# Patient Record
Sex: MALE
[De-identification: names, ages, dates, MRNs, and addresses within clinical notes are randomized per-mention and may not be internally consistent; named-entity substitution may affect disease eponyms.]

---
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
## 2018-09-30 ENCOUNTER — HOSPITAL ENCOUNTER (EMERGENCY)
Dept: HOSPITAL 14 - H.ER | Age: 18
Discharge: HOME | End: 2018-09-30
Payer: COMMERCIAL

## 2018-09-30 ENCOUNTER — HOSPITAL ENCOUNTER (EMERGENCY)
Dept: HOSPITAL 14 - H.ER | Age: 18
LOS: 1 days | Discharge: HOME | End: 2018-10-01
Payer: COMMERCIAL

## 2018-09-30 VITALS — TEMPERATURE: 99.5 F

## 2018-09-30 VITALS — RESPIRATION RATE: 18 BRPM

## 2018-09-30 VITALS — OXYGEN SATURATION: 97 %

## 2018-09-30 VITALS — SYSTOLIC BLOOD PRESSURE: 120 MMHG | HEART RATE: 97 BPM | DIASTOLIC BLOOD PRESSURE: 68 MMHG | RESPIRATION RATE: 16 BRPM

## 2018-09-30 DIAGNOSIS — J02.9: Primary | ICD-10-CM

## 2018-09-30 DIAGNOSIS — R50.9: Primary | ICD-10-CM

## 2018-09-30 DIAGNOSIS — J02.9: ICD-10-CM

## 2018-09-30 LAB
ALBUMIN SERPL-MCNC: 4 G/DL (ref 3.5–5)
ALBUMIN/GLOB SERPL: 1.2 {RATIO} (ref 1–2.1)
ALT SERPL-CCNC: 35 U/L (ref 21–72)
ANISOCYTOSIS BLD QL SMEAR: SLIGHT
AST SERPL-CCNC: 25 U/L (ref 17–59)
BASOPHILS # BLD AUTO: 0 K/UL (ref 0–0.2)
BASOPHILS NFR BLD: 0.3 % (ref 0–2)
BUN SERPL-MCNC: 16 MG/DL (ref 9–20)
CALCIUM SERPL-MCNC: 8.5 MG/DL (ref 8.4–10.2)
EOSINOPHIL # BLD AUTO: 0 K/UL (ref 0–0.7)
EOSINOPHIL NFR BLD: 0.1 % (ref 0–4)
ERYTHROCYTE [DISTWIDTH] IN BLOOD BY AUTOMATED COUNT: 14.1 % (ref 11.5–14.5)
GFR NON-AFRICAN AMERICAN: > 60
HGB BLD-MCNC: 13.8 G/DL (ref 12–18)
LYMPHOCYTE: 17 % (ref 20–50)
LYMPHOCYTES # BLD AUTO: 0.9 K/UL (ref 1–4.3)
LYMPHOCYTES NFR BLD AUTO: 8.6 % (ref 20–40)
MCH RBC QN AUTO: 29.4 PG (ref 27–31)
MCHC RBC AUTO-ENTMCNC: 33.3 G/DL (ref 33–37)
MCV RBC AUTO: 88.2 FL (ref 80–94)
MONOCYTE: 3 % (ref 0–10)
MONOCYTES # BLD: 0.9 K/UL (ref 0–0.8)
MONOCYTES NFR BLD: 8.1 % (ref 0–10)
NEUTROPHILS # BLD: 8.7 K/UL (ref 1.8–7)
NEUTROPHILS NFR BLD AUTO: 80 % (ref 42–75)
NEUTROPHILS NFR BLD AUTO: 82.9 % (ref 50–75)
NRBC BLD AUTO-RTO: 0.1 % (ref 0–0)
OVALOCYTES BLD QL SMEAR: SLIGHT
PLATELET # BLD EST: NORMAL 10*3/UL
PLATELET # BLD: 164 K/UL (ref 130–400)
PMV BLD AUTO: 9.9 FL (ref 7.2–11.7)
POIKILOCYTOSIS BLD QL SMEAR: SLIGHT
RBC # BLD AUTO: 4.71 MIL/UL (ref 4.4–5.9)
TEARDROP CELLS: SLIGHT
TOTAL CELLS COUNTED BLD: 100
WBC # BLD AUTO: 10.5 K/UL (ref 4.8–10.8)

## 2018-09-30 PROCEDURE — 85025 COMPLETE CBC W/AUTO DIFF WBC: CPT

## 2018-09-30 PROCEDURE — 80053 COMPREHEN METABOLIC PANEL: CPT

## 2018-09-30 PROCEDURE — 99284 EMERGENCY DEPT VISIT MOD MDM: CPT

## 2018-09-30 NOTE — ED PDOC
HPI: Fever


Time Seen by Provider: 09/30/18 01:41


Fever Onset Was: 09/29/18 (17:00)


Recent Sick Contacts: No


Have you had recent travel within the past 21 days to any of the following 

countries: [place], [place], [place] or [place]?: No


Additional Comments: 18 year old male with no pmhx presents to the ED for 

evaluation of a fever associated with sore throat and nasal congestion onset 

1700. Patient states that he took his friend's amoxicillin and his sx resolved, 

but they returned later in the evening, prompting the ER visit. Denies sick 

contact, recent travel, nausea, and vomiting.





Past Medical History


Reviewed: Historical Data, Nursing Documentation, Vital Signs


Vital Signs: 





                                Last Vital Signs











Temp  103 F H  09/30/18 01:20


 


Pulse  110 H  09/30/18 01:20


 


Resp  19   09/30/18 01:20


 


BP  127/74   09/30/18 01:20


 


Pulse Ox  97   09/30/18 01:20














- Medical History


PMH: No Chronic Diseases





- Surgical History


Surgical History: No Surg Hx





- Family History


Family History: States: Unknown Family Hx





- Social History


Current smoker - smoking cessation education provided: No


Alcohol: None


Drugs: Denies





- Home Medications


Home Medications: 


                                Ambulatory Orders











 Medication  Instructions  Recorded


 


Naproxen [Naprosyn Tab] 1 tab PO Q8 PRN #21 tab 05/15/17


 


Albuterol HFA [Ventolin HFA 90 2 puff IH [id number] #1 puff 10/11/17





mcg/actuation (8 g)]  


 


Cyclobenzaprine [Cyclobenzaprine 10 mg PO BID #15 tab 10/11/17





HCl]  


 


Ibuprofen [Motrin Tab] 600 mg PO Q6 #30 tab 10/11/17


 


Cephalexin [Keflex] 500 mg PO QID #28 capsule 11/22/17


 


Ibuprofen [Motrin] 600 mg PO Q8 PRN #21 tab 11/22/17


 


Sulfamethoxazole/Trimethoprim 2 tab PO BID #28 tab 11/22/17





[Bactrim  mg-160 mg]  


 


Ibuprofen [Motrin Tab] 600 mg PO Q6 #30 tab 09/30/18














- Allergies


Allergies/Adverse Reactions: 


                                    Allergies











Allergy/AdvReac Type Severity Reaction Status Date / Time


 


No Known Allergies Allergy   Verified 11/23/17 14:22














Review of Systems


ROS Statement: Except As Marked, All Systems Reviewed And Found Negative


Constitutional: Positive for: Fever


ENT: Positive for: Nose Congestion, Throat Pain


Gastrointestinal: Negative for: Nausea, Vomiting





Physical Exam





- Reviewed


Nursing Documentation Reviewed: Yes


Vital Signs Reviewed: Yes





- Physical Exam


Appears: Positive for: No Acute Distress


Head Exam: Positive for: ATRAUMATIC, NORMOCEPHALIC


Skin: Positive for: Normal Color, Warm, Dry.  Negative for: Rash


Eye Exam: Positive for: Normal appearance


ENT: Positive for: Normal ENT Inspection.  Negative for: Pharyngeal Erythema, 

Tonsillar Exudate, Tonsillar Swelling


Neck: Positive for: Normal, Painless ROM, Supple


Cardiovascular/Chest: Positive for: Regular Rate, Rhythm


Respiratory: Positive for: Normal Breath Sounds.  Negative for: Accessory Muscle

Use, Respiratory Distress


Gastrointestinal/Abdominal: Positive for: Normal Exam, Soft.  Negative for: 

Tenderness


Back: Positive for: Normal Inspection


Extremity: Positive for: Normal ROM


Neurologic/Psych: Positive for: Alert, Oriented (x3)





- ECG


O2 Sat by Pulse Oximetry: 97 (RA)


Pulse Ox Interpretation: Normal





Medical Decision Making


Medical Decision Making: 


Time: 0224


A/P: well appearing male with fever, likely viral illness


Initial Plan:


--Ibuprofen 600mg PO


--Throat culture


--Influenza


--Rapid Strep





330


--Patient states he's feeling much better


--Vitals improved


--Advised patient that his symptoms as most likely viral


--Referral given to [facility]


--Very well appearing upon discharge





 

--------------------------------------------------------------------------------


-----------------


Scribe Attestation:


Documented by Fiona Calles, acting as a scribe for Romie Merchant MD.





Provider Scribe Attestation:


All medical record entries made by the Scribe were at my direction and 

personally dictated by me. I have reviewed the chart and agree that the record 

accurately reflects my personal performance of the history, physical exam, 

medical decision making, and the department course for this patient. I have also

personally directed, reviewed, and agree with the discharge instructions and 

disposition.





Disposition





- Clinical Impression


Clinical Impression: 


 Fever








- Disposition


Referrals: 


[facility] [Outside]


Disposition: Routine/Home


Disposition Time: 03:30


Condition: STABLE


Prescriptions: 


Ibuprofen [Motrin Tab] 600 mg PO Q6 #30 tab


Instructions:  Fever of Unknown Origin, Viral Syndrome (DC)


Forms:  [facility] (English)

## 2018-09-30 NOTE — ED PDOC
HPI: CCC, URI, Sore Throat


Time Seen by Provider: 09/30/18 19:58


Chief Complaint (Nursing): Fever


Chief Complaint (Provider): Fever


History Per: Patient


History/Exam Limitations: no limitations


Onset/Duration Of Symptoms: Days (x2)


Current Symptoms Are (Timing): Still Present


Location Of Pain: Throat, Diffuse Myalgias


Associated Symptoms: Vomiting


Additional Complaint(s): 


18 year old male presents to the ED with complaints of fever, sore throat, 

vomiting, and bodyaches, since last night. Patient was seen here yesterday for 

same complaints. He notes mild improvement with Motrin. Denies any cough, 

abdominal pain, diarrhea, hematemesis, or other associated symptoms. 





PMD: None








Past Medical History


Reviewed: Historical Data, Nursing Documentation, Vital Signs


Vital Signs: 





                                Last Vital Signs











Temp  103 F H  09/30/18 20:20


 


Pulse  117 H  09/30/18 19:51


 


Resp  18   09/30/18 19:51


 


BP  121/66   09/30/18 19:51


 


Pulse Ox  97   09/30/18 19:51














- Medical History


PMH: No Chronic Diseases





- Surgical History


Surgical History: No Surg Hx





- Family History


Family History: States: Unknown Family Hx





- Home Medications


Home Medications: 


                                Ambulatory Orders











 Medication  Instructions  Recorded


 


Naproxen [Naprosyn Tab] 1 tab PO Q8 PRN #21 tab 05/15/17


 


Albuterol HFA [Ventolin HFA 90 2 puff IH [id number] #1 puff 10/11/17





mcg/actuation (8 g)]  


 


Cyclobenzaprine [Cyclobenzaprine 10 mg PO BID #15 tab 10/11/17





HCl]  


 


Ibuprofen [Motrin Tab] 600 mg PO Q6 #30 tab 10/11/17


 


Cephalexin [Keflex] 500 mg PO QID #28 capsule 11/22/17


 


Ibuprofen [Motrin] 600 mg PO Q8 PRN #21 tab 11/22/17


 


Sulfamethoxazole/Trimethoprim 2 tab PO BID #28 tab 11/22/17





[Bactrim  mg-160 mg]  


 


Amoxicillin [Amoxil 500 mg Cap] 500 mg PO TID #30 cap 09/30/18


 


Ibuprofen [Motrin Tab] 600 mg PO Q6 #30 tab 09/30/18














- Allergies


Allergies/Adverse Reactions: 


                                    Allergies











Allergy/AdvReac Type Severity Reaction Status Date / Time


 


No Known Allergies Allergy   Verified 09/30/18 19:54














Review of Systems


ROS Statement: Except As Marked, All Systems Reviewed And Found Negative


Constitutional: Positive for: Fever


ENT: Positive for: Throat Pain


Cardiovascular: Negative for: Chest Pain


Respiratory: Negative for: Cough, Wheezing


Gastrointestinal: Positive for: Nausea, Vomiting.  Negative for: Abdominal Pain,

Diarrhea





Physical Exam





- Reviewed


Nursing Documentation Reviewed: Yes


Vital Signs Reviewed: Yes





- Physical Exam


Appears: Positive for: Non-toxic, No Acute Distress


Head Exam: Positive for: ATRAUMATIC, NORMOCEPHALIC


Skin: Positive for: Normal Color, Warm, Dry


Eye Exam: Positive for: EOMI, Normal appearance, PERRL


ENT: Positive for: Pharyngeal Erythema.  Negative for: Tonsillar Exudate


Neck: Positive for: Painless ROM, Supple


Cardiovascular/Chest: Positive for: Regular Rate, Rhythm


Respiratory: Positive for: Normal Breath Sounds.  Negative for: Rales, Rhonchi, 

Wheezing, Respiratory Distress


Gastrointestinal/Abdominal: Positive for: Normal Exam, Soft.  Negative for: 

Tenderness


Extremity: Positive for: Normal ROM.  Negative for: Calf Tenderness, Swelling


Neurologic/Psych: Positive for: Alert, Oriented (x3)





- Laboratory Results


Result Diagrams: 


                                 09/30/18 20:30





                                 09/30/18 20:30





- ECG


O2 Sat by Pulse Oximetry: 97 (RA)


Pulse Ox Interpretation: Normal





Medical Decision Making


Medical Decision Making: 





Time: 20:13





Initial Impression: 19 y/o male with fever, sore throat, vomiting, bodyaches 





Initial Plan: 


IV fluids and Tylenol PO administered.


Labs ordered and reviewed. 





 

--------------------------------------------------------------------------------


-----------------


Scribe Attestation:


Documented by Jennifer Pedroza, acting as a scribe for Dr. Augusto Best MD.





Provider Scribe Attestation:


All medical record entries made by the Scribe were at my direction and 

personally dictated by me. I have reviewed the chart and agree that the record 

accurately reflects my personal performance of the history, physical exam, 

medical decision making, and the department course for this patient. I have also

personally directed, reviewed, and agree with the discharge instructions and 

disposition.





Disposition





- Clinical Impression


Clinical Impression: 


 Pharyngitis








- Patient ED Disposition


Is Patient to be Admitted: No


Counseled Patient/Family Regarding: Studies Performed, Diagnosis, Need For 

Followup, Rx Given





- Disposition


Referrals: 


[facility] [Outside]


Disposition: Routine/Home


Disposition Time: 23:38


Condition: FAIR


Prescriptions: 


Amoxicillin [Amoxil 500 mg Cap] 500 mg PO TID #30 cap


Instructions:  Sore Throat in Adults


Forms:  [facility] (English)

## 2018-10-01 ENCOUNTER — HOSPITAL ENCOUNTER (EMERGENCY)
Dept: HOSPITAL 31 - C.ER | Age: 18
Discharge: HOME | End: 2018-10-01
Payer: SELF-PAY

## 2018-10-01 VITALS
OXYGEN SATURATION: 99 % | SYSTOLIC BLOOD PRESSURE: 129 MMHG | DIASTOLIC BLOOD PRESSURE: 71 MMHG | HEART RATE: 84 BPM | TEMPERATURE: 98.6 F

## 2018-10-01 VITALS
RESPIRATION RATE: 17 BRPM | SYSTOLIC BLOOD PRESSURE: 124 MMHG | DIASTOLIC BLOOD PRESSURE: 70 MMHG | HEART RATE: 95 BPM | TEMPERATURE: 98.7 F

## 2018-10-01 VITALS — OXYGEN SATURATION: 97 %

## 2018-10-01 DIAGNOSIS — J02.0: Primary | ICD-10-CM

## 2018-10-01 DIAGNOSIS — R50.9: ICD-10-CM

## 2018-10-01 LAB
ALBUMIN SERPL-MCNC: 4.6 G/DL (ref 3.5–5)
ALBUMIN/GLOB SERPL: 1.4 {RATIO} (ref 1–2.1)
ALT SERPL-CCNC: 28 U/L (ref 21–72)
AST SERPL-CCNC: 28 U/L (ref 17–59)
BASE EXCESS BLDV CALC-SCNC: 0.8 MMOL/L (ref 0–2)
BASOPHILS # BLD AUTO: 0 K/UL (ref 0–0.2)
BASOPHILS NFR BLD: 0.3 % (ref 0–2)
BUN SERPL-MCNC: 13 MG/DL (ref 9–20)
CALCIUM SERPL-MCNC: 9.2 MG/DL (ref 8.6–10.4)
EOSINOPHIL # BLD AUTO: 0 K/UL (ref 0–0.7)
EOSINOPHIL NFR BLD: 0.1 % (ref 0–4)
ERYTHROCYTE [DISTWIDTH] IN BLOOD BY AUTOMATED COUNT: 14 % (ref 11.5–14.5)
GFR NON-AFRICAN AMERICAN: > 60
HGB BLD-MCNC: 14.8 G/DL (ref 12–18)
LYMPHOCYTE: 6 % (ref 20–40)
LYMPHOCYTES # BLD AUTO: 0.9 K/UL (ref 1–4.3)
LYMPHOCYTES NFR BLD AUTO: 8.6 % (ref 20–40)
MCH RBC QN AUTO: 29.6 PG (ref 27–31)
MCHC RBC AUTO-ENTMCNC: 34.6 G/DL (ref 33–37)
MCV RBC AUTO: 85.6 FL (ref 80–94)
MONOCYTE: 6 % (ref 0–10)
MONOCYTES # BLD: 0.8 K/UL (ref 0–0.8)
MONOCYTES NFR BLD: 7.9 % (ref 0–10)
NEUTROPHILS # BLD: 8.6 K/UL (ref 1.8–7)
NEUTROPHILS NFR BLD AUTO: 83 % (ref 50–75)
NEUTROPHILS NFR BLD AUTO: 83.1 % (ref 50–75)
NEUTS BAND NFR BLD: 4 % (ref 0–2)
NRBC BLD AUTO-RTO: 0.1 % (ref 0–2)
PCO2 BLDV: 39 MMHG (ref 40–60)
PH BLDV: 7.42 [PH] (ref 7.32–7.43)
PLATELET # BLD EST: NORMAL 10*3/UL
PLATELET # BLD: 181 K/UL (ref 130–400)
PMV BLD AUTO: 10 FL (ref 7.2–11.7)
RBC # BLD AUTO: 5 MIL/UL (ref 4.4–5.9)
RBC MORPH BLD: NORMAL
TOTAL CELLS COUNTED BLD: 100
VARIANT LYMPHS NFR BLD MANUAL: 1 % (ref 0–0)
VENOUS BLOOD GAS PO2: 44 MM/HG (ref 30–55)
WBC # BLD AUTO: 10.3 K/UL (ref 4.8–10.8)

## 2018-10-01 PROCEDURE — 87040 BLOOD CULTURE FOR BACTERIA: CPT

## 2018-10-01 PROCEDURE — 80053 COMPREHEN METABOLIC PANEL: CPT

## 2018-10-01 PROCEDURE — 99285 EMERGENCY DEPT VISIT HI MDM: CPT

## 2018-10-01 PROCEDURE — 96365 THER/PROPH/DIAG IV INF INIT: CPT

## 2018-10-01 PROCEDURE — 85025 COMPLETE CBC W/AUTO DIFF WBC: CPT

## 2018-10-01 PROCEDURE — 83735 ASSAY OF MAGNESIUM: CPT

## 2018-10-01 PROCEDURE — 82803 BLOOD GASES ANY COMBINATION: CPT

## 2018-10-01 NOTE — C.PDOC
History Of Present Illness


17 y/o male presents to the ED accompanied by parents, with complaints of 

persistent fever and sore throat for the past 3 days. Associated with difficulty

swallowing, decreased appetite, and chills. Patient was seen twice over the we

ekend at [facility] ED and discharged home with amoxicillin and Motrin 600mg. He 

reports starting the antibiotic yesterday and has been taking antipyretics TID. 

States his temperature this evening was 104.3 sublingual, prompting him to 

return to the ED. Otherwise patient denies any voice changes, abdominal pain, 

nausea, vomiting, cough, or other associated symptoms.





Time Seen by Provider: 10/01/18 21:17


Chief Complaint (Nursing): ENT Problem


History Per: Patient


History/Exam Limitations: no limitations


Onset/Duration Of Symptoms: Days


Current Symptoms Are (Timing): Still Present


Location Of Pain: Throat


Sick Contacts (Context): None


Associated Symptoms: Fever





Past Medical History


Reviewed: Historical Data, Nursing Documentation, Vital Signs


Vital Signs: 





                                Last Vital Signs











Temp  102.1 F H  10/01/18 20:36


 


Pulse  122 H  10/01/18 20:36


 


Resp  18   10/01/18 20:36


 


BP  126/71   10/01/18 20:36


 


Pulse Ox  97   10/01/18 20:36














- Medical History


PMH: No Chronic Diseases


Surgical History: No Surg Hx





- [facility] Procedures











IMMOBILIZ/WOUND ATTN NEC (04/15/15)








Family History: States: Unknown Family Hx





- Social History


Hx Tobacco Use: Yes (hooka)


Hx Alcohol Use: Yes


Hx Substance Use: No





- Immunization History


Hx Tetanus Toxoid Vaccination: No


Hx Influenza Vaccination: No


Hx Pneumococcal Vaccination: No





Review Of Systems


Constitutional: Positive for: Fever, Chills


ENT: Positive for: Throat Pain, Other (Difficulty swallowing).  Negative for: 

Nose Congestion


Respiratory: Negative for: Cough, Shortness of Breath


Gastrointestinal: Negative for: Nausea, Vomiting, Abdominal Pain, Diarrhea


Neurological: Negative for: Weakness, Headache, Dizziness





Physical Exam





- Physical Exam


Appears: Non-toxic, No Acute Distress


Skin: Normal Color, Warm, Diaphoretic


Head: Atraumatic, Normacephalic


Eye(s): bilateral: Normal Inspection, PERRL, EOMI


Throat: Erythema (to the posterior oropharynx), Exudate, Other (Enlarged tonsils

bilaterally)


Neck: Normal ROM, Supple


Lymphatic: Adenopathy (to the cervical chain)


Chest: Symmetrical


Cardiovascular: Rhythm Regular, No Murmur


Respiratory: Normal Breath Sounds, No Rales, No Rhonchi, No Wheezing


Extremity: Bilateral: Atraumatic, Normal Color And Temperature, Normal ROM


Neurological/Psych: Oriented x3, Normal Speech





ED Course And Treatment





- Laboratory Results


Result Diagrams: 


                                 10/01/18 21:44





                                 10/01/18 21:44


O2 Sat by Pulse Oximetry: 97 (RA)


Pulse Ox Interpretation: Normal





Medical Decision Making


Medical Decision Making: 





Impresson: 17 y/o male with persistent sore throat and fever, began Amoxicillin 

course yesterday





Plan:


--VBG


--CMP


--Magnesium


--CBC


--UA


--urine culture


--blood culture


--IV fluids


--Tylenol 975 mg PO


--IV rocephin











Disposition





- Disposition


Disposition: HOME/ ROUTINE


Disposition Time: 22:49


Condition: STABLE


Additional Instructions: 


Alternate Motrin 600 and Tylenol 975 every 3 hours. 


Take your antibiotics as indicated. 


Follow up with your doctor or our clinic. 





Instructions:  Strep Throat (DC)


Forms:  [employer] (English), General Discharge Instructions, Work Excuse





- POA


Present On Arrival: None





- Clinical Impression


Clinical Impression: 


 Fever, Strep pharyngitis








- Scribe Statement


The provider has reviewed the documentation as recorded by the Scribe (Luiza Rowell)


Provider Attestation: 





All medical record entries made by the Scribe were at my direction and 

personally dictated by me. I have reviewed the chart and agree that the record 

accurately reflects my personal performance of the history, physical exam, 

medical decision making, and the department course for this patient. I have also

personally directed, reviewed, and agree with the discharge instructions and 

disposition.